# Patient Record
Sex: MALE | Race: WHITE | NOT HISPANIC OR LATINO | Employment: FULL TIME | URBAN - METROPOLITAN AREA
[De-identification: names, ages, dates, MRNs, and addresses within clinical notes are randomized per-mention and may not be internally consistent; named-entity substitution may affect disease eponyms.]

---

## 2019-08-06 ENCOUNTER — OFFICE VISIT (OUTPATIENT)
Dept: FAMILY MEDICINE CLINIC | Facility: CLINIC | Age: 39
End: 2019-08-06

## 2019-08-06 VITALS
HEART RATE: 67 BPM | RESPIRATION RATE: 16 BRPM | OXYGEN SATURATION: 97 % | HEIGHT: 72 IN | DIASTOLIC BLOOD PRESSURE: 80 MMHG | WEIGHT: 142.8 LBS | SYSTOLIC BLOOD PRESSURE: 116 MMHG | BODY MASS INDEX: 19.34 KG/M2

## 2019-08-06 DIAGNOSIS — L30.8 OTHER ECZEMA: Primary | ICD-10-CM

## 2019-08-06 PROCEDURE — 99213 OFFICE O/P EST LOW 20 MIN: CPT | Performed by: FAMILY MEDICINE

## 2019-08-06 RX ORDER — CLOTRIMAZOLE AND BETAMETHASONE DIPROPIONATE 10; .64 MG/G; MG/G
CREAM TOPICAL 2 TIMES DAILY
Qty: 45 G | Refills: 0 | Status: SHIPPED | OUTPATIENT
Start: 2019-08-06

## 2019-08-06 NOTE — PROGRESS NOTES
"Subjective   Dominic Atkins is a 39 y.o. male.     Rash   This is a new (fingers on right hand and big toe) problem. The current episode started more than 1 month ago. The problem is unchanged. The affected locations include the right hand and right toes. The rash is characterized by peeling, scaling and redness. Associated with: lots of water at [place]. Pertinent negatives include no congestion, cough, fever, joint pain, nail changes, shortness of breath or sore throat. Past treatments include antibiotic cream. The treatment provided no relief.        The following portions of the patient's history were reviewed and updated as appropriate: allergies, current medications, past social history and problem list.    Review of Systems   Constitutional: Negative for chills and fever.   HENT: Negative for congestion and sore throat.    Respiratory: Negative for cough and shortness of breath.    Cardiovascular: Negative for chest pain and palpitations.   Musculoskeletal: Negative for joint pain.   Skin: Positive for color change and rash. Negative for nail changes.       Objective   /80   Pulse 67   Resp 16   Ht 182.9 cm (72\")   Wt 64.8 kg (142 lb 12.8 oz)   SpO2 97%   BMI 19.37 kg/m²   Physical Exam   Constitutional: He appears well-developed and well-nourished.   HENT:   Head: Normocephalic and atraumatic.   Eyes: Conjunctivae are normal. Pupils are equal, round, and reactive to light.   Neck: Neck supple.   Skin: Rash noted. There is erythema.   Scaly erythematous rash on the right ring finger and in the webspace and a scaly dermatitis of the right big toe   Nursing note and vitals reviewed.      Assessment/Plan   Problem List Items Addressed This Visit     None      Visit Diagnoses     Other eczema    -  Primary    Relevant Medications    clotrimazole-betamethasone (LOTRISONE) 1-0.05 % cream          New Medications Ordered This Visit   Medications   • clotrimazole-betamethasone (LOTRISONE) 1-0.05 % cream     " Sig: Apply  topically to the appropriate area as directed 2 (Two) Times a Day.     Dispense:  45 g     Refill:  0     Return to clinic if symptoms persist may need Derm referral but certainly does not resemble scabies